# Patient Record
Sex: FEMALE | Race: WHITE | NOT HISPANIC OR LATINO | URBAN - METROPOLITAN AREA
[De-identification: names, ages, dates, MRNs, and addresses within clinical notes are randomized per-mention and may not be internally consistent; named-entity substitution may affect disease eponyms.]

---

## 2017-06-25 ENCOUNTER — INPATIENT (INPATIENT)
Facility: HOSPITAL | Age: 67
LOS: 3 days | Discharge: HOME | DRG: 305 | End: 2017-06-29
Attending: INTERNAL MEDICINE | Admitting: HOSPITALIST
Payer: MEDICARE

## 2017-06-25 VITALS
SYSTOLIC BLOOD PRESSURE: 229 MMHG | TEMPERATURE: 98 F | OXYGEN SATURATION: 97 % | DIASTOLIC BLOOD PRESSURE: 116 MMHG | RESPIRATION RATE: 18 BRPM | HEART RATE: 97 BPM

## 2017-06-25 DIAGNOSIS — I16.1 HYPERTENSIVE EMERGENCY: ICD-10-CM

## 2017-06-25 LAB
ALBUMIN SERPL ELPH-MCNC: 4.8 G/DL — SIGNIFICANT CHANGE UP (ref 3.3–5)
ALP SERPL-CCNC: 96 U/L — SIGNIFICANT CHANGE UP (ref 40–120)
ALT FLD-CCNC: 24 U/L RC — SIGNIFICANT CHANGE UP (ref 10–45)
AMYLASE P1 CFR SERPL: 103 U/L — SIGNIFICANT CHANGE UP (ref 25–125)
ANION GAP SERPL CALC-SCNC: 16 MMOL/L — SIGNIFICANT CHANGE UP (ref 5–17)
APTT BLD: 29.5 SEC — SIGNIFICANT CHANGE UP (ref 27.5–37.4)
AST SERPL-CCNC: 35 U/L — SIGNIFICANT CHANGE UP (ref 10–40)
BASOPHILS # BLD AUTO: 0 K/UL — SIGNIFICANT CHANGE UP (ref 0–0.2)
BASOPHILS NFR BLD AUTO: 0.1 % — SIGNIFICANT CHANGE UP (ref 0–2)
BILIRUB SERPL-MCNC: 0.6 MG/DL — SIGNIFICANT CHANGE UP (ref 0.2–1.2)
BUN SERPL-MCNC: 19 MG/DL — SIGNIFICANT CHANGE UP (ref 7–23)
CALCIUM SERPL-MCNC: 10 MG/DL — SIGNIFICANT CHANGE UP (ref 8.4–10.5)
CHLORIDE SERPL-SCNC: 103 MMOL/L — SIGNIFICANT CHANGE UP (ref 96–108)
CO2 SERPL-SCNC: 25 MMOL/L — SIGNIFICANT CHANGE UP (ref 22–31)
CREAT SERPL-MCNC: 1.04 MG/DL — SIGNIFICANT CHANGE UP (ref 0.5–1.3)
EOSINOPHIL # BLD AUTO: 0 K/UL — SIGNIFICANT CHANGE UP (ref 0–0.5)
EOSINOPHIL NFR BLD AUTO: 0.5 % — SIGNIFICANT CHANGE UP (ref 0–6)
GLUCOSE SERPL-MCNC: 106 MG/DL — HIGH (ref 70–99)
HCT VFR BLD CALC: 39.3 % — SIGNIFICANT CHANGE UP (ref 34.5–45)
HGB BLD-MCNC: 14.1 G/DL — SIGNIFICANT CHANGE UP (ref 11.5–15.5)
INR BLD: 0.92 RATIO — SIGNIFICANT CHANGE UP (ref 0.88–1.16)
LIDOCAIN IGE QN: 33 U/L — SIGNIFICANT CHANGE UP (ref 7–60)
LYMPHOCYTES # BLD AUTO: 2.5 K/UL — SIGNIFICANT CHANGE UP (ref 1–3.3)
LYMPHOCYTES # BLD AUTO: 32 % — SIGNIFICANT CHANGE UP (ref 13–44)
MCHC RBC-ENTMCNC: 33.6 PG — SIGNIFICANT CHANGE UP (ref 27–34)
MCHC RBC-ENTMCNC: 35.9 GM/DL — SIGNIFICANT CHANGE UP (ref 32–36)
MCV RBC AUTO: 93.7 FL — SIGNIFICANT CHANGE UP (ref 80–100)
MONOCYTES # BLD AUTO: 0.6 K/UL — SIGNIFICANT CHANGE UP (ref 0–0.9)
MONOCYTES NFR BLD AUTO: 7.9 % — SIGNIFICANT CHANGE UP (ref 2–14)
NEUTROPHILS # BLD AUTO: 4.6 K/UL — SIGNIFICANT CHANGE UP (ref 1.8–7.4)
NEUTROPHILS NFR BLD AUTO: 59.5 % — SIGNIFICANT CHANGE UP (ref 43–77)
PLATELET # BLD AUTO: 203 K/UL — SIGNIFICANT CHANGE UP (ref 150–400)
POTASSIUM SERPL-MCNC: 4.3 MMOL/L — SIGNIFICANT CHANGE UP (ref 3.5–5.3)
POTASSIUM SERPL-SCNC: 4.3 MMOL/L — SIGNIFICANT CHANGE UP (ref 3.5–5.3)
PROT SERPL-MCNC: 8.4 G/DL — HIGH (ref 6–8.3)
PROTHROM AB SERPL-ACNC: 9.9 SEC — SIGNIFICANT CHANGE UP (ref 9.8–12.7)
RBC # BLD: 4.19 M/UL — SIGNIFICANT CHANGE UP (ref 3.8–5.2)
RBC # FLD: 13.2 % — SIGNIFICANT CHANGE UP (ref 10.3–14.5)
SODIUM SERPL-SCNC: 144 MMOL/L — SIGNIFICANT CHANGE UP (ref 135–145)
WBC # BLD: 7.8 K/UL — SIGNIFICANT CHANGE UP (ref 3.8–10.5)
WBC # FLD AUTO: 7.8 K/UL — SIGNIFICANT CHANGE UP (ref 3.8–10.5)

## 2017-06-25 PROCEDURE — 72125 CT NECK SPINE W/O DYE: CPT | Mod: 26

## 2017-06-25 PROCEDURE — 99233 SBSQ HOSP IP/OBS HIGH 50: CPT

## 2017-06-25 PROCEDURE — 71010: CPT | Mod: 26

## 2017-06-25 PROCEDURE — 70450 CT HEAD/BRAIN W/O DYE: CPT | Mod: 26

## 2017-06-25 PROCEDURE — 99285 EMERGENCY DEPT VISIT HI MDM: CPT | Mod: GC

## 2017-06-25 RX ORDER — SODIUM CHLORIDE 9 MG/ML
1000 INJECTION INTRAMUSCULAR; INTRAVENOUS; SUBCUTANEOUS ONCE
Qty: 0 | Refills: 0 | Status: COMPLETED | OUTPATIENT
Start: 2017-06-25 | End: 2017-06-25

## 2017-06-25 RX ORDER — LABETALOL HCL 100 MG
10 TABLET ORAL ONCE
Qty: 0 | Refills: 0 | Status: COMPLETED | OUTPATIENT
Start: 2017-06-25 | End: 2017-06-25

## 2017-06-25 RX ORDER — TETANUS TOXOID, REDUCED DIPHTHERIA TOXOID AND ACELLULAR PERTUSSIS VACCINE, ADSORBED 5; 2.5; 8; 8; 2.5 [IU]/.5ML; [IU]/.5ML; UG/.5ML; UG/.5ML; UG/.5ML
0.5 SUSPENSION INTRAMUSCULAR ONCE
Qty: 0 | Refills: 0 | Status: COMPLETED | OUTPATIENT
Start: 2017-06-25 | End: 2017-06-25

## 2017-06-25 RX ORDER — ACETAMINOPHEN 500 MG
1000 TABLET ORAL ONCE
Qty: 0 | Refills: 0 | Status: COMPLETED | OUTPATIENT
Start: 2017-06-25 | End: 2017-06-25

## 2017-06-25 RX ORDER — AMLODIPINE BESYLATE 2.5 MG/1
5 TABLET ORAL ONCE
Qty: 0 | Refills: 0 | Status: COMPLETED | OUTPATIENT
Start: 2017-06-25 | End: 2017-06-25

## 2017-06-25 RX ADMIN — Medication 10 MILLIGRAM(S): at 23:13

## 2017-06-25 RX ADMIN — Medication 1000 MILLIGRAM(S): at 22:34

## 2017-06-25 RX ADMIN — SODIUM CHLORIDE 1000 MILLILITER(S): 9 INJECTION INTRAMUSCULAR; INTRAVENOUS; SUBCUTANEOUS at 19:53

## 2017-06-25 RX ADMIN — Medication 400 MILLIGRAM(S): at 21:05

## 2017-06-25 RX ADMIN — AMLODIPINE BESYLATE 5 MILLIGRAM(S): 2.5 TABLET ORAL at 21:05

## 2017-06-25 RX ADMIN — TETANUS TOXOID, REDUCED DIPHTHERIA TOXOID AND ACELLULAR PERTUSSIS VACCINE, ADSORBED 0.5 MILLILITER(S): 5; 2.5; 8; 8; 2.5 SUSPENSION INTRAMUSCULAR at 19:52

## 2017-06-25 NOTE — ED PROVIDER NOTE - OBJECTIVE STATEMENT
Attending Rolle: 65 y/o female presenting after fall. pt reports drinking alcohol today and falling down stairs. pt with h/o htn does not take medication for blood pressure. pt reports mild ha and bump to back of her head. had 3-4 glasses of wine. pt able to walk afterward. not on blood thinners  last tetanus unknown.

## 2017-06-25 NOTE — CONSULT NOTE ADULT - SUBJECTIVE AND OBJECTIVE BOX
Level 2 Trauma Activation    HPI: 66F s/p fall. Patient states she was at a party and was drinking some wine (about 4 glasses since 2 PM) and fell. +headstrike, denies LOC.     Primary Survey  A - airway intact  B - bilateral breath sounds and good chest rise  C - initially BP: 223/117, palpable pulses in all extremities  D - GCS 15 on arrival  Exposure obtained      Secondary survey  gen: NAD  HEENT: left forehead abrasions, left posterior scalp hematoma  CV: s1, s2, Regular rate and rhythm.   Pulm: CTA B/L  Chest: No TTP  Abd: Soft, non- distended, Non tender to palpation, no rebound, no guarding  Groin: Normal appearing  Ext: palp radial b/l UE, b/l DP palp in Lower Extremities   Back: no TTP, no palpable runoff/stepoff/deformity    PMH: HTN, ETOH abuse/relapse    PSH: ectopic pregnancy, cholecystectomy    MEDS: denies    Allergies: No Known Allergies      Labs:                        14.1   7.8   )-----------( 203      ( 25 Jun 2017 20:09 )             39.3     06-25    144  |  103  |  19  ----------------------------<  106<H>  4.3   |  25  |  1.04    Ca    10.0      25 Jun 2017 20:09    TPro  8.4<H>  /  Alb  4.8  /  TBili  0.6  /  DBili  x   /  AST  35  /  ALT  24  /  AlkPhos  96  06-25    PT/INR - ( 25 Jun 2017 20:09 )   PT: 9.9 sec;   INR: 0.92 ratio         PTT - ( 25 Jun 2017 20:09 )  PTT:29.5 sec        IMAGING:  CT head/cspine prelim radiology report:  IMPRESSION:   CT BRAIN:   Moderate left frontoparietal scalp hematoma. No calvarial fracture.  No acute intracranial bleeding, mass effect, or shift.     CT CERVICAL SPINE:   No fracture or subluxation.   Cervical spine spondylosis. Level 2 Trauma Activation    HPI: 66F s/p fall. Patient states she was at a party and was drinking some wine (about 4 glasses since 2 PM) and fell. +headstrike, denies LOC. Complains of pain to the back of her head after her fall, better with pain meds, no N/V. 10-pt ROS otherwise negative.    Primary Survey  A - airway intact  B - bilateral breath sounds and good chest rise  C - initially BP: 223/117, palpable pulses in all extremities  D - GCS 15 on arrival  Exposure obtained      Secondary survey  gen: NAD, A&Ox4, calm  HEENT: left forehead abrasions, left posterior scalp hematoma  PERRL  CV: s1, s2, Regular rate and rhythm.   Pulm: CTA B/L  Chest: No TTP  Abd: Soft, non- distended, Non tender to palpation, no rebound, no guarding  Groin: Normal appearing external genitalia  Ext: palp radial b/l UE, b/l DP palp in Lower Extremities   Back: no TTP, no palpable runoff/stepoff/deformity  Psych: normal affect    PMH: HTN, ETOH abuse/relapse    PSH: ectopic pregnancy, cholecystectomy    MEDS: denies    Allergies: No Known Allergies    SHx: +alcohol, no tobacco    FHx: non-contributory      Labs:                        14.1   7.8   )-----------( 203      ( 25 Jun 2017 20:09 )             39.3     06-25    144  |  103  |  19  ----------------------------<  106<H>  4.3   |  25  |  1.04    Ca    10.0      25 Jun 2017 20:09    TPro  8.4<H>  /  Alb  4.8  /  TBili  0.6  /  DBili  x   /  AST  35  /  ALT  24  /  AlkPhos  96  06-25    PT/INR - ( 25 Jun 2017 20:09 )   PT: 9.9 sec;   INR: 0.92 ratio         PTT - ( 25 Jun 2017 20:09 )  PTT:29.5 sec        IMAGING:  CT head/cspine prelim radiology report:  IMPRESSION:   CT BRAIN:   Moderate left frontoparietal scalp hematoma. No calvarial fracture.  No acute intracranial bleeding, mass effect, or shift.     CT CERVICAL SPINE:   No fracture or subluxation.   Cervical spine spondylosis.

## 2017-06-25 NOTE — ED PROVIDER NOTE - PHYSICAL EXAMINATION
Attending Rolle: Gen: nad, heent: posterior hematoma, mmm, eomi, neck: trachea midline, collared, chest: nttp, cv: rrr, lungs; Ctab, abd: soft, nontender, nondistended.no peritoneal signs, skin: surgical scar to abdomen. pelvis: stable, ext: wwp, no deformity, neuro awake and alert following commands Attending Rolle: Gen: nad, heent: posterior hematoma, frontoparietal hematoma, no active bleeding mmm, eomi, neck: trachea midline, collared, chest: nttp, cv: rrr, lungs; Ctab, abd: soft, nontender, nondistended.no peritoneal signs, skin: surgical scar to abdomen. pelvis: stable, ext: wwp, no deformity, neuro awake and alert following commands

## 2017-06-25 NOTE — CONSULT NOTE ADULT - ASSESSMENT
66F s/p fall with posterior scalp hematoma  -await final reports of CT scans  -ED to clear c-collar by confrontational exam  -If patient is able to ambulate/tolerate diet, and CT scan final reads show no acute injury, poss d/c home  -d/w Dr. Centeno

## 2017-06-25 NOTE — CONSULT NOTE ADULT - ATTENDING COMMENTS
I saw and examined pt on 6/25 during level 2 trauma activation, agree with above. Pt was alert and awake, said that she had several drinks at a party and fell on the stairs, hitting her head. No LOC. Left occipital scalp hematoma and left hand abrasions. CT head and C-spine negative for acute injury. Admitted to Medicine for BP control.

## 2017-06-25 NOTE — ED PROVIDER NOTE - MEDICAL DECISION MAKING DETAILS
Attending Aquilino: 67 y/o female with h/o htn not on medications presenting after fall. pt with +etoh. upon arrival pt found to be sig hypertensive. asymptomatic at that time without headache or chest pain. pt awake and following commands. abd soft and nonteder and hemodynamically stable making solid organ injury less liekly. ct head and neck negative for acute pathology. pt with sig hypertension and while in the ed developed headache. unclear whether related to bp, recent alcohol use, vs concussion. with sig htn with treat for urgency and admit

## 2017-06-26 DIAGNOSIS — I16.0 HYPERTENSIVE URGENCY: ICD-10-CM

## 2017-06-26 DIAGNOSIS — M62.82 RHABDOMYOLYSIS: ICD-10-CM

## 2017-06-26 DIAGNOSIS — F10.20 ALCOHOL DEPENDENCE, UNCOMPLICATED: ICD-10-CM

## 2017-06-26 LAB
ANION GAP SERPL CALC-SCNC: 13 MMOL/L — SIGNIFICANT CHANGE UP (ref 5–17)
APPEARANCE UR: CLEAR — SIGNIFICANT CHANGE UP
BACTERIA # UR AUTO: ABNORMAL /HPF
BILIRUB UR-MCNC: NEGATIVE — SIGNIFICANT CHANGE UP
BUN SERPL-MCNC: 18 MG/DL — SIGNIFICANT CHANGE UP (ref 7–23)
CALCIUM SERPL-MCNC: 9.3 MG/DL — SIGNIFICANT CHANGE UP (ref 8.4–10.5)
CHLORIDE SERPL-SCNC: 101 MMOL/L — SIGNIFICANT CHANGE UP (ref 96–108)
CK MB BLD-MCNC: 0.9 % — SIGNIFICANT CHANGE UP (ref 0–3.5)
CK MB BLD-MCNC: 1.5 % — SIGNIFICANT CHANGE UP (ref 0–3.5)
CK MB CFR SERPL CALC: 11.9 NG/ML — HIGH (ref 0–3.8)
CK MB CFR SERPL CALC: 22.5 NG/ML — HIGH (ref 0–3.8)
CK MB CFR SERPL CALC: 27.9 NG/ML — HIGH (ref 0–3.8)
CK SERPL-CCNC: 1889 U/L — HIGH (ref 25–170)
CK SERPL-CCNC: 2424 U/L — HIGH (ref 25–170)
CK SERPL-CCNC: 2425 U/L — HIGH (ref 25–170)
CO2 SERPL-SCNC: 24 MMOL/L — SIGNIFICANT CHANGE UP (ref 22–31)
COLOR SPEC: YELLOW — SIGNIFICANT CHANGE UP
CREAT ?TM UR-MCNC: 75 MG/DL — SIGNIFICANT CHANGE UP
CREAT SERPL-MCNC: 0.75 MG/DL — SIGNIFICANT CHANGE UP (ref 0.5–1.3)
DIFF PNL FLD: NEGATIVE — SIGNIFICANT CHANGE UP
EPI CELLS # UR: SIGNIFICANT CHANGE UP /HPF
GLUCOSE SERPL-MCNC: 130 MG/DL — HIGH (ref 70–99)
GLUCOSE UR QL: NEGATIVE — SIGNIFICANT CHANGE UP
HAV IGM SER-ACNC: SIGNIFICANT CHANGE UP
HBV CORE IGM SER-ACNC: SIGNIFICANT CHANGE UP
HBV SURFACE AG SER-ACNC: SIGNIFICANT CHANGE UP
HCV AB S/CO SERPL IA: 0.16 S/CO — SIGNIFICANT CHANGE UP
HCV AB SERPL-IMP: SIGNIFICANT CHANGE UP
KETONES UR-MCNC: NEGATIVE — SIGNIFICANT CHANGE UP
LEUKOCYTE ESTERASE UR-ACNC: ABNORMAL
MAGNESIUM SERPL-MCNC: 1.8 MG/DL — SIGNIFICANT CHANGE UP (ref 1.6–2.6)
MYOGLOBIN SERPL-MCNC: 110 NG/ML — HIGH (ref 9–82)
NITRITE UR-MCNC: NEGATIVE — SIGNIFICANT CHANGE UP
PH UR: 6.5 — SIGNIFICANT CHANGE UP (ref 5–8)
PHOSPHATE SERPL-MCNC: 3.6 MG/DL — SIGNIFICANT CHANGE UP (ref 2.5–4.5)
POTASSIUM SERPL-MCNC: 3.8 MMOL/L — SIGNIFICANT CHANGE UP (ref 3.5–5.3)
POTASSIUM SERPL-SCNC: 3.8 MMOL/L — SIGNIFICANT CHANGE UP (ref 3.5–5.3)
PROT ?TM UR-MCNC: 11 MG/DL — SIGNIFICANT CHANGE UP (ref 0–12)
PROT UR-MCNC: SIGNIFICANT CHANGE UP
PROT/CREAT UR-RTO: 0.1 RATIO — SIGNIFICANT CHANGE UP (ref 0–0.2)
RBC CASTS # UR COMP ASSIST: SIGNIFICANT CHANGE UP /HPF (ref 0–2)
SODIUM SERPL-SCNC: 138 MMOL/L — SIGNIFICANT CHANGE UP (ref 135–145)
SP GR SPEC: 1.02 — SIGNIFICANT CHANGE UP (ref 1.01–1.02)
TROPONIN T SERPL-MCNC: <0.01 NG/ML — SIGNIFICANT CHANGE UP (ref 0–0.06)
UROBILINOGEN FLD QL: NEGATIVE — SIGNIFICANT CHANGE UP
WBC UR QL: >50 /HPF (ref 0–5)

## 2017-06-26 PROCEDURE — 99223 1ST HOSP IP/OBS HIGH 75: CPT

## 2017-06-26 PROCEDURE — 99233 SBSQ HOSP IP/OBS HIGH 50: CPT

## 2017-06-26 RX ORDER — ONDANSETRON 8 MG/1
4 TABLET, FILM COATED ORAL
Qty: 0 | Refills: 0 | Status: DISCONTINUED | OUTPATIENT
Start: 2017-06-26 | End: 2017-06-29

## 2017-06-26 RX ORDER — METOCLOPRAMIDE HCL 10 MG
10 TABLET ORAL ONCE
Qty: 0 | Refills: 0 | Status: COMPLETED | OUTPATIENT
Start: 2017-06-26 | End: 2017-06-26

## 2017-06-26 RX ORDER — ACETAMINOPHEN 500 MG
650 TABLET ORAL EVERY 6 HOURS
Qty: 0 | Refills: 0 | Status: DISCONTINUED | OUTPATIENT
Start: 2017-06-26 | End: 2017-06-29

## 2017-06-26 RX ORDER — FOLIC ACID 0.8 MG
1 TABLET ORAL DAILY
Qty: 0 | Refills: 0 | Status: DISCONTINUED | OUTPATIENT
Start: 2017-06-26 | End: 2017-06-29

## 2017-06-26 RX ORDER — LABETALOL HCL 100 MG
100 TABLET ORAL ONCE
Qty: 0 | Refills: 0 | Status: COMPLETED | OUTPATIENT
Start: 2017-06-26 | End: 2017-06-26

## 2017-06-26 RX ORDER — THIAMINE MONONITRATE (VIT B1) 100 MG
100 TABLET ORAL DAILY
Qty: 0 | Refills: 0 | Status: COMPLETED | OUTPATIENT
Start: 2017-06-26 | End: 2017-06-28

## 2017-06-26 RX ORDER — METOCLOPRAMIDE HCL 10 MG
10 TABLET ORAL ONCE
Qty: 0 | Refills: 0 | Status: DISCONTINUED | OUTPATIENT
Start: 2017-06-26 | End: 2017-06-26

## 2017-06-26 RX ORDER — SODIUM CHLORIDE 9 MG/ML
1000 INJECTION, SOLUTION INTRAVENOUS
Qty: 0 | Refills: 0 | Status: DISCONTINUED | OUTPATIENT
Start: 2017-06-26 | End: 2017-06-27

## 2017-06-26 RX ORDER — METOPROLOL TARTRATE 50 MG
25 TABLET ORAL
Qty: 0 | Refills: 0 | Status: DISCONTINUED | OUTPATIENT
Start: 2017-06-26 | End: 2017-06-29

## 2017-06-26 RX ADMIN — Medication 1 MILLIGRAM(S): at 11:35

## 2017-06-26 RX ADMIN — Medication 1 TABLET(S): at 11:35

## 2017-06-26 RX ADMIN — Medication 100 MILLIGRAM(S): at 11:35

## 2017-06-26 RX ADMIN — Medication 650 MILLIGRAM(S): at 21:03

## 2017-06-26 RX ADMIN — Medication 650 MILLIGRAM(S): at 04:41

## 2017-06-26 RX ADMIN — Medication 650 MILLIGRAM(S): at 12:13

## 2017-06-26 RX ADMIN — Medication 100 MILLIGRAM(S): at 03:01

## 2017-06-26 RX ADMIN — Medication 650 MILLIGRAM(S): at 12:45

## 2017-06-26 RX ADMIN — Medication 10 MILLIGRAM(S): at 00:19

## 2017-06-26 RX ADMIN — Medication 650 MILLIGRAM(S): at 21:33

## 2017-06-26 RX ADMIN — SODIUM CHLORIDE 100 MILLILITER(S): 9 INJECTION, SOLUTION INTRAVENOUS at 17:47

## 2017-06-26 RX ADMIN — Medication 650 MILLIGRAM(S): at 05:23

## 2017-06-26 RX ADMIN — Medication 25 MILLIGRAM(S): at 06:06

## 2017-06-26 RX ADMIN — Medication 25 MILLIGRAM(S): at 17:40

## 2017-06-26 NOTE — H&P ADULT - NSHPLABSRESULTS_GEN_ALL_CORE
WBC 7.8.  Hgb 14.1.  Platelets of 203K.  INR 0.9.  K+ 4.3.  Random glucose of 106.  Cr 1.0.  Alb 4.8.  CPK 1889 (suspected mild rhabdomyolysis)  Troponin nil x 2.  CTT head with no intracranial hemorrhage, LEFT parietal scalp hematoma.  Chest radiograph reviewed with no infiltate or effusion.  EKG tracing reviewed with NSR at 97.

## 2017-06-26 NOTE — H&P ADULT - PROBLEM SELECTOR PLAN 3
Mild rhabdomyolysis with intact renal function.  Will follow with enzymes.   Patient's HTN precludes aggressive IVF NS.

## 2017-06-26 NOTE — PROGRESS NOTE ADULT - PROBLEM SELECTOR PLAN 3
Mild rhabdomyolysis with intact renal function.  Will follow with enzymes.   Start patient on Ringer Lactate @100 cc/hr. Monitor lytes.

## 2017-06-26 NOTE — H&P ADULT - HISTORY OF PRESENT ILLNESS
NIGHT HOSPITALIST:  Patient UNKNOWN to me previously, assigned to me at this point via the ER to admit this 65 y/o F--patient irregularly followed by a Dr. Dial (?) in [facility]--patient is a retired speech pathologist--self refers to [place] following a fall down residential down her stairs at home while imbibing alcohol.  Patient admits that she was abstinent until 3 months ago (patient did not clarify prior intake) but reports daily wine intake with beer chasers.  Last intake was prior to arrival in the ER yesterday afternoon.  Patient denies prior hospitalizations.  Denies street drug use.  Patient reports no suicidal or homicidal ideation and reports no domestic violence issues.  Mild HA earlier in the ER, but denies HA, focal weakness.  No chest pain/pressure.  NO dyspnea.  NO N/V.  No abdominal pain, no red blood per rectum or melena.  No back pain, no tearing back pain.  No hematuria, no dysuria.  Patient denies anorexia but notes recent weight gain.  Remaining review of systems not contributory. NIGHT HOSPITALIST:  Patient UNKNOWN to me previously, assigned to me at this point via the ER to admit this 65 y/o F--patient irregularly followed by a Dr. 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  Patient admits that she was abstinent until 3 months ago (patient did not clarify prior intake) but reports daily wine intake with beer chasers.  Last intake was prior to arrival in the ER yesterday afternoon.  Patient denies prior hospitalizations.  Denies street drug use.  Patient reports no suicidal or homicidal ideation and reports no domestic violence issues.  Mild HA earlier in the ER, but denies HA, focal weakness.  No chest pain/pressure.  NO dyspnea.  NO N/V.  No abdominal pain, no red blood per rectum or melena.  No back pain, no tearing back pain.  No hematuria, no dysuria.  Patient denies anorexia but notes recent weight gain.  Remaining review of systems not contributory.    No family in attendance and patient does not wish family to be contacted.

## 2017-06-26 NOTE — ED ADULT NURSE REASSESSMENT NOTE - NS ED NURSE REASSESS COMMENT FT1
MD aware of increased blood pressure. IV/PO anti hypertensives administered. RN spoke to hospitalist. Awaiting inpatient orders. Will continue to monitor

## 2017-06-26 NOTE — H&P ADULT - FAMILY HISTORY
Father  Still living? Unknown  Family history of essential hypertension, Age at diagnosis: Age Unknown  Family history of alcohol abuse, Age at diagnosis: 61-70     Mother  Still living? No  Family history of essential hypertension, Age at diagnosis: Age Unknown  Family history of alcohol abuse, Age at diagnosis: 41-50

## 2017-06-26 NOTE — PROGRESS NOTE ADULT - SUBJECTIVE AND OBJECTIVE BOX
Patient is a 66y old  Female who presents with a chief complaint of s/p fall   SUBJECTIVE / OVERNIGHT EVENTS: No events Over night.     MEDICATIONS  (STANDING):  folic acid 1milliGRAM(s) Oral daily  multivitamin 1Tablet(s) Oral daily  thiamine 100milliGRAM(s) Oral daily  metoprolol 25milliGRAM(s) Oral two times a day    MEDICATIONS  (PRN):  LORazepam     Tablet 2milliGRAM(s) Oral every 2 hours PRN Symptom-triggered: 2 point increase in CIWA -Ar score and a total score of 7 or LESS  LORazepam   Injectable 2milliGRAM(s) IV Push every 1 hour PRN Symptom-triggered: each CIWA -Ar score 8 or GREATER  ondansetron Injectable 4milliGRAM(s) IV Push two times a day PRN Nausea  acetaminophen   Tablet. 650milliGRAM(s) Oral every 6 hours PRN Mild Pain (1 - 3)    MEDICATIONS  (STANDING):  folic acid 1milliGRAM(s) Oral daily  multivitamin 1Tablet(s) Oral daily  thiamine 100milliGRAM(s) Oral daily  metoprolol 25milliGRAM(s) Oral two times a day    MEDICATIONS  (PRN):  LORazepam     Tablet 2milliGRAM(s) Oral every 2 hours PRN Symptom-triggered: 2 point increase in CIWA -Ar score and a total score of 7 or LESS  LORazepam   Injectable 2milliGRAM(s) IV Push every 1 hour PRN Symptom-triggered: each CIWA -Ar score 8 or GREATER  ondansetron Injectable 4milliGRAM(s) IV Push two times a day PRN Nausea  acetaminophen   Tablet. 650milliGRAM(s) Oral every 6 hours PRN Mild Pain (1 - 3)            PHYSICAL EXAM:  GENERAL: NAD, well-developed  HEAD:  Atraumatic, Normocephalic  EYES: EOMI, PERRLA, conjunctiva and sclera clear, Ecchymosis around Lt eye  Posterior scalp hematoma    NECK: Supple, No JVD  CHEST/LUNG: Clear to auscultation bilaterally; No wheeze  HEART: Regular rate and rhythm  ABDOMEN: Soft, Nontender, Nondistended; Bowel sounds present  EXTREMITIES:  2+ Peripheral Pulses, No clubbing, cyanosis, or edema  PSYCH: AAOx3  NEUROLOGY: non-focal  SKIN: No rashes or lesions        RADIOLOGY & ADDITIONAL TESTS:    Imaging Personally Reviewed:    Consultant(s) Notes Reviewed:  Trauma Surgery     Care Discussed with Consultants/Other Providers:

## 2017-06-26 NOTE — H&P ADULT - PROBLEM SELECTOR PLAN 2
CIWA protocol as above with symptom based treatment for now, but may need to reevaluate if need for low dose Librium.  Social work.  Thiamine, folate, MVI.

## 2017-06-26 NOTE — H&P ADULT - ATTENDING COMMENTS
NIGHT HOSPITALIST;  Patient aware of course and agrees with plan/care as above.  Care reviewed with covering NP.  Care assumed by the DAY HOSPITALIST.

## 2017-06-26 NOTE — H&P ADULT - ASSESSMENT
NIGHT HOSPITALIST:  Presentation of patient in the setting S/P fall--seen and cleared by the ER attending for trauma in the setting of ethanol dependency>>will place on symptom based HIGH risk protocol for now.  Social work.  Patient with essential HTN with poor control and admitted for hypertensive urgency.  Will provide one dose of oral labetalol but will initiate low dose metoprolol 25 BID.  Norvasc is an ideal outpatient medication but will take 1-2 weeks for target goals, albeit in Ms. Horacio would be systolic target to 170-180 Torr.  Serial enzymes, echo.

## 2017-06-27 DIAGNOSIS — M25.532 PAIN IN LEFT WRIST: ICD-10-CM

## 2017-06-27 LAB
ANION GAP SERPL CALC-SCNC: 13 MMOL/L — SIGNIFICANT CHANGE UP (ref 5–17)
BUN SERPL-MCNC: 12 MG/DL — SIGNIFICANT CHANGE UP (ref 7–23)
CALCIUM SERPL-MCNC: 9.1 MG/DL — SIGNIFICANT CHANGE UP (ref 8.4–10.5)
CHLORIDE SERPL-SCNC: 101 MMOL/L — SIGNIFICANT CHANGE UP (ref 96–108)
CK SERPL-CCNC: 1744 U/L — HIGH (ref 25–170)
CO2 SERPL-SCNC: 26 MMOL/L — SIGNIFICANT CHANGE UP (ref 22–31)
CREAT SERPL-MCNC: 0.66 MG/DL — SIGNIFICANT CHANGE UP (ref 0.5–1.3)
GLUCOSE SERPL-MCNC: 112 MG/DL — HIGH (ref 70–99)
HCT VFR BLD CALC: 35.7 % — SIGNIFICANT CHANGE UP (ref 34.5–45)
HGB BLD-MCNC: 11.9 G/DL — SIGNIFICANT CHANGE UP (ref 11.5–15.5)
MAGNESIUM SERPL-MCNC: 2 MG/DL — SIGNIFICANT CHANGE UP (ref 1.6–2.6)
MCHC RBC-ENTMCNC: 29.7 PG — SIGNIFICANT CHANGE UP (ref 27–34)
MCHC RBC-ENTMCNC: 33.3 GM/DL — SIGNIFICANT CHANGE UP (ref 32–36)
MCV RBC AUTO: 89 FL — SIGNIFICANT CHANGE UP (ref 80–100)
MYOGLOBIN SERPL-MCNC: 54 NG/ML — SIGNIFICANT CHANGE UP (ref 9–82)
PHOSPHATE SERPL-MCNC: 3.5 MG/DL — SIGNIFICANT CHANGE UP (ref 2.5–4.5)
PLATELET # BLD AUTO: 204 K/UL — SIGNIFICANT CHANGE UP (ref 150–400)
POTASSIUM SERPL-MCNC: 4.2 MMOL/L — SIGNIFICANT CHANGE UP (ref 3.5–5.3)
POTASSIUM SERPL-SCNC: 4.2 MMOL/L — SIGNIFICANT CHANGE UP (ref 3.5–5.3)
RBC # BLD: 4.01 M/UL — SIGNIFICANT CHANGE UP (ref 3.8–5.2)
RBC # FLD: 15 % — HIGH (ref 10.3–14.5)
SODIUM SERPL-SCNC: 140 MMOL/L — SIGNIFICANT CHANGE UP (ref 135–145)
WBC # BLD: 6.47 K/UL — SIGNIFICANT CHANGE UP (ref 3.8–10.5)
WBC # FLD AUTO: 6.47 K/UL — SIGNIFICANT CHANGE UP (ref 3.8–10.5)

## 2017-06-27 PROCEDURE — 73090 X-RAY EXAM OF FOREARM: CPT | Mod: 26,LT

## 2017-06-27 PROCEDURE — 99233 SBSQ HOSP IP/OBS HIGH 50: CPT | Mod: GC

## 2017-06-27 RX ORDER — LISINOPRIL 2.5 MG/1
2.5 TABLET ORAL DAILY
Qty: 0 | Refills: 0 | Status: DISCONTINUED | OUTPATIENT
Start: 2017-06-27 | End: 2017-06-28

## 2017-06-27 RX ORDER — SODIUM CHLORIDE 9 MG/ML
1000 INJECTION, SOLUTION INTRAVENOUS
Qty: 0 | Refills: 0 | Status: DISCONTINUED | OUTPATIENT
Start: 2017-06-27 | End: 2017-06-29

## 2017-06-27 RX ADMIN — Medication 1 TABLET(S): at 12:23

## 2017-06-27 RX ADMIN — Medication 650 MILLIGRAM(S): at 08:25

## 2017-06-27 RX ADMIN — Medication 25 MILLIGRAM(S): at 05:56

## 2017-06-27 RX ADMIN — Medication 650 MILLIGRAM(S): at 09:10

## 2017-06-27 RX ADMIN — Medication 650 MILLIGRAM(S): at 21:30

## 2017-06-27 RX ADMIN — Medication 25 MILLIGRAM(S): at 17:19

## 2017-06-27 RX ADMIN — Medication 650 MILLIGRAM(S): at 22:00

## 2017-06-27 RX ADMIN — Medication 100 MILLIGRAM(S): at 12:23

## 2017-06-27 RX ADMIN — Medication 1 MILLIGRAM(S): at 12:23

## 2017-06-27 RX ADMIN — SODIUM CHLORIDE 100 MILLILITER(S): 9 INJECTION, SOLUTION INTRAVENOUS at 12:23

## 2017-06-27 NOTE — PROGRESS NOTE ADULT - PROBLEM SELECTOR PLAN 1
Mild rhabdomyolysis with intact renal function.  Will follow with enzymes.   Start patient on Ringer Lactate @100 cc/hr. Monitor lytes. Resolving with CPK levels around 1744 with intact renal function.  Continue with Ringer Lactate @100cc/hr.

## 2017-06-27 NOTE — PROGRESS NOTE ADULT - ASSESSMENT
NIGHT HOSPITALIST:  Presentation of patient in the setting S/P fall--seen and cleared by the ER attending for trauma in the setting of ethanol dependency>>will place on symptom based HIGH risk protocol for now.  Social work.  Patient with essential HTN with poor control and admitted for hypertensive urgency.  Will provide one dose of oral labetalol but will initiate low dose metoprolol 25 BID.  Norvasc is an ideal outpatient medication but will take 1-2 weeks for target goals, albeit in Ms. Horacio would be systolic target to 170-180 Torr.  Serial enzymes, echo. 66 F with hx Alcohol dependence, HTN admitted with Hypertensive Urgency, mild Rhabdomyolysis s/p fall

## 2017-06-27 NOTE — DIETITIAN INITIAL EVALUATION ADULT. - ENERGY NEEDS
Height: 5' 5"      Weight: 180.9 lbs   BMI: 30.1 kg/m2      IBW: 125 lbs  (+/- 10%)    % IBW: 145 %          Edema:  1+ right/left ankle, right/left foot    Skin:  no impairment   BM:  last BM 6/25/17    Other pertinent information:  Patient presented s/p fall - half a flight of stairs, ethanol intake.  Admitted with hypertensive urgency, alcohol dependency, Rhabdomyolysis.

## 2017-06-27 NOTE — PROGRESS NOTE ADULT - PROBLEM SELECTOR PLAN 4
CIWA protocol as above with symptom based treatment for now, but may need to reevaluate if need for low dose Librium.  Social work.  Thiamine, folate, MVI. Continue with MVI, Thiamine and Folate.

## 2017-06-27 NOTE — DIETITIAN INITIAL EVALUATION ADULT. - OTHER INFO
Nutrition consult for ethanol dependency and HTN received.  Patient denies having any difficulty chewing or swallowing and no nausea/emesis or GI discomfort. Nutrition consult for ethanol dependency and HTN received.  Patient reports having a good appetite at the present time.  She denies having any difficulty chewing or swallowing and no nausea/emesis or GI discomfort.  Patient stated that she had a "drinking relapse" over the past few months, almost daily intakes of wine and beer.  States that she had not been exercising and had been gaining weight.  Reports she had achieved a weight of 150 lbs a few years ago.  Patient had taken vitamins in the past, not recently.  Confirmed NKFA.

## 2017-06-27 NOTE — DIETITIAN INITIAL EVALUATION ADULT. - ORAL INTAKE PTA
good Patient reports having a good appetite PTA eating 3 meals daily.   Typically consumes egg and turkey shelton sandwich at breakfast, Japanese food at lunch and chicken, rice and vegetables for dinner./good

## 2017-06-27 NOTE — DIETITIAN INITIAL EVALUATION ADULT. - NS AS NUTRI INTERV ED CONTENT
Educated patient on heart healthy nutrition therapy, label reading  to reduce saturated fat/sodium intake.  Discussed importance of not adding salt to foods and avoiding foods with high sodium content, suggesting alternate choices../Purpose of the nutrition education

## 2017-06-27 NOTE — PROGRESS NOTE ADULT - SUBJECTIVE AND OBJECTIVE BOX
Patient is a 66y old  Female who presents with a chief complaint of s/p fall   SUBJECTIVE / OVERNIGHT EVENTS: No events Over night.      MEDICATIONS  (STANDING):  folic acid 1 milliGRAM(s) Oral daily  multivitamin 1 Tablet(s) Oral daily  thiamine 100 milliGRAM(s) Oral daily  metoprolol 25 milliGRAM(s) Oral two times a day  lactated ringers. 1000 milliLiter(s) (100 mL/Hr) IV Continuous <Continuous>  lisinopril 2.5 milliGRAM(s) Oral daily    MEDICATIONS  (PRN):  LORazepam     Tablet 2 milliGRAM(s) Oral every 2 hours PRN Symptom-triggered: 2 point increase in CIWA -Ar score and a total score of 7 or LESS  LORazepam   Injectable 2 milliGRAM(s) IV Push every 1 hour PRN Symptom-triggered: each CIWA -Ar score 8 or GREATER  ondansetron Injectable 4 milliGRAM(s) IV Push two times a day PRN Nausea  acetaminophen   Tablet. 650 milliGRAM(s) Oral every 6 hours PRN Mild Pain (1 - 3)    T(C): 37.1 (06-27-17 @ 12:00), Max: 37.7 (06-27-17 @ 04:51)  HR: 63 (06-27-17 @ 12:00) (63 - 76)  BP: 159/84 (06-27-17 @ 12:00) (159/84 - 180/91)  RR: 20 (06-27-17 @ 12:00) (18 - 20)  SpO2: 95% (06-27-17 @ 12:00) (95% - 98%)          PHYSICAL EXAM:  GENERAL: NAD, well-developed  HEAD:  Atraumatic, Normocephalic  EYES: EOMI, conjunctiva and sclera clear, Ecchymosis around Lt eye  Posterior scalp hematoma    NECK: Supple, No JVD  CHEST/LUNG: Clear to auscultation bilaterally; No wheeze  HEART: Regular rate and rhythm  ABDOMEN: Soft, Nontender, Nondistended; Bowel sounds present  EXTREMITIES:  2+ Peripheral Pulses, No clubbing, cyanosis, or edema, Lt wrist swollen, erythematous  PSYCH: AAOx3  NEUROLOGY: non-focal  SKIN: No rashes or lesions        RADIOLOGY & ADDITIONAL TESTS:    Imaging Personally Reviewed:    Consultant(s) Notes Reviewed:  Trauma Surgery     Care Discussed with Consultants/Other Providers:

## 2017-06-27 NOTE — PROGRESS NOTE ADULT - PROBLEM SELECTOR PLAN 3
Monitor BP, continue with Metoprolol, add Lisinopril. Stable currently, continue with Metoprolol, add Lisinopril.

## 2017-06-27 NOTE — DIETITIAN INITIAL EVALUATION ADULT. - ADHERENCE
poor/Patient tries not to add salt to her food at the table, although,  admits that she has not been adhering to low salt or low cholesterol diet restrictions.  She had been eating out more often.

## 2017-06-28 DIAGNOSIS — I10 ESSENTIAL (PRIMARY) HYPERTENSION: ICD-10-CM

## 2017-06-28 LAB
ANION GAP SERPL CALC-SCNC: 12 MMOL/L — SIGNIFICANT CHANGE UP (ref 5–17)
BASOPHILS # BLD AUTO: 0.01 K/UL — SIGNIFICANT CHANGE UP (ref 0–0.2)
BASOPHILS NFR BLD AUTO: 0.2 % — SIGNIFICANT CHANGE UP (ref 0–2)
BUN SERPL-MCNC: 14 MG/DL — SIGNIFICANT CHANGE UP (ref 7–23)
CALCIUM SERPL-MCNC: 9.1 MG/DL — SIGNIFICANT CHANGE UP (ref 8.4–10.5)
CHLORIDE SERPL-SCNC: 102 MMOL/L — SIGNIFICANT CHANGE UP (ref 96–108)
CK SERPL-CCNC: 909 U/L — HIGH (ref 25–170)
CO2 SERPL-SCNC: 26 MMOL/L — SIGNIFICANT CHANGE UP (ref 22–31)
CREAT SERPL-MCNC: 0.53 MG/DL — SIGNIFICANT CHANGE UP (ref 0.5–1.3)
EOSINOPHIL # BLD AUTO: 0.15 K/UL — SIGNIFICANT CHANGE UP (ref 0–0.5)
EOSINOPHIL NFR BLD AUTO: 2.4 % — SIGNIFICANT CHANGE UP (ref 0–6)
GLUCOSE SERPL-MCNC: 99 MG/DL — SIGNIFICANT CHANGE UP (ref 70–99)
HCT VFR BLD CALC: 35.7 % — SIGNIFICANT CHANGE UP (ref 34.5–45)
HGB BLD-MCNC: 11.9 G/DL — SIGNIFICANT CHANGE UP (ref 11.5–15.5)
IMM GRANULOCYTES NFR BLD AUTO: 0.2 % — SIGNIFICANT CHANGE UP (ref 0–1.5)
LYMPHOCYTES # BLD AUTO: 1.35 K/UL — SIGNIFICANT CHANGE UP (ref 1–3.3)
LYMPHOCYTES # BLD AUTO: 21.5 % — SIGNIFICANT CHANGE UP (ref 13–44)
MCHC RBC-ENTMCNC: 30.4 PG — SIGNIFICANT CHANGE UP (ref 27–34)
MCHC RBC-ENTMCNC: 33.3 GM/DL — SIGNIFICANT CHANGE UP (ref 32–36)
MCV RBC AUTO: 91.1 FL — SIGNIFICANT CHANGE UP (ref 80–100)
MONOCYTES # BLD AUTO: 0.65 K/UL — SIGNIFICANT CHANGE UP (ref 0–0.9)
MONOCYTES NFR BLD AUTO: 10.4 % — SIGNIFICANT CHANGE UP (ref 2–14)
NEUTROPHILS # BLD AUTO: 4.1 K/UL — SIGNIFICANT CHANGE UP (ref 1.8–7.4)
NEUTROPHILS NFR BLD AUTO: 65.3 % — SIGNIFICANT CHANGE UP (ref 43–77)
PLATELET # BLD AUTO: 191 K/UL — SIGNIFICANT CHANGE UP (ref 150–400)
POTASSIUM SERPL-MCNC: 4 MMOL/L — SIGNIFICANT CHANGE UP (ref 3.5–5.3)
POTASSIUM SERPL-SCNC: 4 MMOL/L — SIGNIFICANT CHANGE UP (ref 3.5–5.3)
RBC # BLD: 3.92 M/UL — SIGNIFICANT CHANGE UP (ref 3.8–5.2)
RBC # FLD: 15 % — HIGH (ref 10.3–14.5)
SODIUM SERPL-SCNC: 140 MMOL/L — SIGNIFICANT CHANGE UP (ref 135–145)
TSH SERPL-MCNC: 5.44 UIU/ML — HIGH (ref 0.27–4.2)
WBC # BLD: 6.27 K/UL — SIGNIFICANT CHANGE UP (ref 3.8–10.5)
WBC # FLD AUTO: 6.27 K/UL — SIGNIFICANT CHANGE UP (ref 3.8–10.5)

## 2017-06-28 PROCEDURE — 99233 SBSQ HOSP IP/OBS HIGH 50: CPT

## 2017-06-28 PROCEDURE — 93306 TTE W/DOPPLER COMPLETE: CPT | Mod: 26

## 2017-06-28 RX ORDER — LISINOPRIL 2.5 MG/1
5 TABLET ORAL DAILY
Qty: 0 | Refills: 0 | Status: DISCONTINUED | OUTPATIENT
Start: 2017-06-29 | End: 2017-06-29

## 2017-06-28 RX ORDER — LISINOPRIL 2.5 MG/1
2.5 TABLET ORAL ONCE
Qty: 0 | Refills: 0 | Status: COMPLETED | OUTPATIENT
Start: 2017-06-28 | End: 2017-06-28

## 2017-06-28 RX ADMIN — Medication 25 MILLIGRAM(S): at 05:38

## 2017-06-28 RX ADMIN — Medication 1 TABLET(S): at 12:35

## 2017-06-28 RX ADMIN — Medication 100 MILLIGRAM(S): at 12:35

## 2017-06-28 RX ADMIN — LISINOPRIL 2.5 MILLIGRAM(S): 2.5 TABLET ORAL at 05:37

## 2017-06-28 RX ADMIN — Medication 1 MILLIGRAM(S): at 12:35

## 2017-06-28 RX ADMIN — LISINOPRIL 2.5 MILLIGRAM(S): 2.5 TABLET ORAL at 14:58

## 2017-06-28 RX ADMIN — Medication 25 MILLIGRAM(S): at 17:53

## 2017-06-28 NOTE — PROGRESS NOTE ADULT - SUBJECTIVE AND OBJECTIVE BOX
FAITH KILGOREO  66y  Female      Patient is a 66y old  Female who presents with a chief complaint of s/p fall (26 Jun 2017 05:53)      INTERVAL HPI/OVERNIGHT EVENTS:  No overnight event.  No headache.  Patient's BP is still optimal on Lisinopril and Metoprolol.    No hallucinations as per nurse / CIWA= 0 .    REVIEW OF SYSTEMS:  CONSTITUTIONAL: No fever  EYES: No eye pain, visual disturbances, or discharge  ENMT:  Nor throat pain  NECK: No pain or stiffness  RESPIRATORY: No cough, wheezing, chills or hemoptysis; No shortness of breath  CARDIOVASCULAR: No chest pain, palpitations, dizziness, or leg swelling  GASTROINTESTINAL: No abdominal or epigastric pain. No nausea, vomiting, or hematemesis; No diarrhea or constipation  NEUROLOGICAL: No headaches,  no tremors  MUSCULOSKELETAL: No joint pain currently   PSYCHIATRIC: No delusions or hallucinations    T(C): 36.8 (06-28-17 @ 11:32), Max: 37.7 (06-27-17 @ 20:58)  HR: 67 (06-28-17 @ 11:32) (62 - 75)  BP: 170/91 (06-28-17 @ 11:32) (138/90 - 174/91)  RR: 18 (06-28-17 @ 11:32) (18 - 18)  SpO2: 96% (06-28-17 @ 11:32) (95% - 98%)  Wt(kg): --Vital Signs Last 24 Hrs  T(C): 36.8 (28 Jun 2017 11:32), Max: 37.7 (27 Jun 2017 20:58)  T(F): 98.3 (28 Jun 2017 11:32), Max: 99.8 (27 Jun 2017 20:58)  HR: 67 (28 Jun 2017 11:32) (62 - 75)  BP: 170/91 (28 Jun 2017 11:32) (138/90 - 174/91)  BP(mean): --  RR: 18 (28 Jun 2017 11:32) (18 - 18)  SpO2: 96% (28 Jun 2017 11:32) (95% - 98%)  Wt(kg): --    PHYSICAL EXAM:  GENERAL: NAD, well-groomed, well-developed  HEAD:  Atraumatic, Normocephalic  EYES: EOMI, PERRLA, conjunctiva and sclera clear  ENMT: No tonsillar erythema, exudates, or enlargement; Moist mucous membranes  NECK: Supple, No JVD, Normal thyroid/ large soft non erythematous area on the left sumbandibular area which patient states to have for many years and she will f/up with her PMD   NERVOUS SYSTEM:  Alert & Oriented X3, Good concentration; Motor Strength 5/5 B/L upper and lower extremities  CHEST/LUNG: Clear to auscultation bilaterally; No rales, rhonchi, wheezing, or rubs  HEART: Regular rate and rhythm; No murmurs, rubs, or gallops  ABDOMEN: Soft, Nontender, Nondistended; Bowel sounds present  EXTREMITIES:  2+ Peripheral Pulses, No clubbing, cyanosis, or edema  SKIN: left eye ecchymosis and hematoma of the left frontal scalp     Care Discussed with Consultants/Other Providers [ x] YES Nurse     LABS:                        11.9   6.27  )-----------( 191      ( 28 Jun 2017 08:45 )             35.7     06-28    140  |  102  |  14  ----------------------------<  99  4.0   |  26  |  0.53    Ca    9.1      28 Jun 2017 08:45  Phos  3.5     06-27  Mg     2.0     06-27          HEALTH ISSUES - PROBLEM Dx:  Wrist pain, acute, left: Wrist pain, acute, left  Rhabdomyolysis: Rhabdomyolysis  Alcohol dependence: Alcohol dependence  Hypertensive urgency: Hypertensive urgency

## 2017-06-28 NOTE — PROGRESS NOTE ADULT - ASSESSMENT
66 F with hx Alcohol dependence, HTN admitted with Hypertensive Urgency, with resolving Rhabdomyolysis s/p fall

## 2017-06-28 NOTE — PROGRESS NOTE ADULT - PROBLEM SELECTOR PLAN 1
Not at goal , will continue metoprolol   Will increase Lisinopril to 5 mg daily   repeat BP / Nurse is aware

## 2017-06-29 VITALS — HEART RATE: 72 BPM | SYSTOLIC BLOOD PRESSURE: 168 MMHG | DIASTOLIC BLOOD PRESSURE: 77 MMHG

## 2017-06-29 LAB
ALBUMIN SERPL ELPH-MCNC: 3.3 G/DL — SIGNIFICANT CHANGE UP (ref 3.3–5)
ALP SERPL-CCNC: 57 U/L — SIGNIFICANT CHANGE UP (ref 40–120)
ALT FLD-CCNC: 22 U/L — SIGNIFICANT CHANGE UP (ref 10–45)
ANION GAP SERPL CALC-SCNC: 15 MMOL/L — SIGNIFICANT CHANGE UP (ref 5–17)
AST SERPL-CCNC: 36 U/L — SIGNIFICANT CHANGE UP (ref 10–40)
BASOPHILS # BLD AUTO: 0.02 K/UL — SIGNIFICANT CHANGE UP (ref 0–0.2)
BASOPHILS NFR BLD AUTO: 0.4 % — SIGNIFICANT CHANGE UP (ref 0–2)
BILIRUB SERPL-MCNC: 1.1 MG/DL — SIGNIFICANT CHANGE UP (ref 0.2–1.2)
BUN SERPL-MCNC: 15 MG/DL — SIGNIFICANT CHANGE UP (ref 7–23)
CALCIUM SERPL-MCNC: 9.3 MG/DL — SIGNIFICANT CHANGE UP (ref 8.4–10.5)
CHLORIDE SERPL-SCNC: 104 MMOL/L — SIGNIFICANT CHANGE UP (ref 96–108)
CO2 SERPL-SCNC: 24 MMOL/L — SIGNIFICANT CHANGE UP (ref 22–31)
CREAT SERPL-MCNC: 0.63 MG/DL — SIGNIFICANT CHANGE UP (ref 0.5–1.3)
EOSINOPHIL # BLD AUTO: 0.11 K/UL — SIGNIFICANT CHANGE UP (ref 0–0.5)
EOSINOPHIL NFR BLD AUTO: 2 % — SIGNIFICANT CHANGE UP (ref 0–6)
GLUCOSE SERPL-MCNC: 103 MG/DL — HIGH (ref 70–99)
HCT VFR BLD CALC: 35 % — SIGNIFICANT CHANGE UP (ref 34.5–45)
HGB BLD-MCNC: 11.6 G/DL — SIGNIFICANT CHANGE UP (ref 11.5–15.5)
IMM GRANULOCYTES NFR BLD AUTO: 0.2 % — SIGNIFICANT CHANGE UP (ref 0–1.5)
LYMPHOCYTES # BLD AUTO: 1.5 K/UL — SIGNIFICANT CHANGE UP (ref 1–3.3)
LYMPHOCYTES # BLD AUTO: 26.7 % — SIGNIFICANT CHANGE UP (ref 13–44)
MCHC RBC-ENTMCNC: 30 PG — SIGNIFICANT CHANGE UP (ref 27–34)
MCHC RBC-ENTMCNC: 33.1 GM/DL — SIGNIFICANT CHANGE UP (ref 32–36)
MCV RBC AUTO: 90.4 FL — SIGNIFICANT CHANGE UP (ref 80–100)
MONOCYTES # BLD AUTO: 0.55 K/UL — SIGNIFICANT CHANGE UP (ref 0–0.9)
MONOCYTES NFR BLD AUTO: 9.8 % — SIGNIFICANT CHANGE UP (ref 2–14)
NEUTROPHILS # BLD AUTO: 3.42 K/UL — SIGNIFICANT CHANGE UP (ref 1.8–7.4)
NEUTROPHILS NFR BLD AUTO: 60.9 % — SIGNIFICANT CHANGE UP (ref 43–77)
PLATELET # BLD AUTO: 198 K/UL — SIGNIFICANT CHANGE UP (ref 150–400)
POTASSIUM SERPL-MCNC: 3.9 MMOL/L — SIGNIFICANT CHANGE UP (ref 3.5–5.3)
POTASSIUM SERPL-SCNC: 3.9 MMOL/L — SIGNIFICANT CHANGE UP (ref 3.5–5.3)
PROT SERPL-MCNC: 6.9 G/DL — SIGNIFICANT CHANGE UP (ref 6–8.3)
RBC # BLD: 3.87 M/UL — SIGNIFICANT CHANGE UP (ref 3.8–5.2)
RBC # FLD: 14.4 % — SIGNIFICANT CHANGE UP (ref 10.3–14.5)
SODIUM SERPL-SCNC: 143 MMOL/L — SIGNIFICANT CHANGE UP (ref 135–145)
WBC # BLD: 5.61 K/UL — SIGNIFICANT CHANGE UP (ref 3.8–10.5)
WBC # FLD AUTO: 5.61 K/UL — SIGNIFICANT CHANGE UP (ref 3.8–10.5)

## 2017-06-29 PROCEDURE — 99239 HOSP IP/OBS DSCHRG MGMT >30: CPT

## 2017-06-29 PROCEDURE — 93306 TTE W/DOPPLER COMPLETE: CPT

## 2017-06-29 PROCEDURE — 96374 THER/PROPH/DIAG INJ IV PUSH: CPT

## 2017-06-29 PROCEDURE — 85027 COMPLETE CBC AUTOMATED: CPT

## 2017-06-29 PROCEDURE — 99285 EMERGENCY DEPT VISIT HI MDM: CPT | Mod: 25

## 2017-06-29 PROCEDURE — 96375 TX/PRO/DX INJ NEW DRUG ADDON: CPT

## 2017-06-29 PROCEDURE — 84100 ASSAY OF PHOSPHORUS: CPT

## 2017-06-29 PROCEDURE — 82150 ASSAY OF AMYLASE: CPT

## 2017-06-29 PROCEDURE — 82550 ASSAY OF CK (CPK): CPT

## 2017-06-29 PROCEDURE — 93005 ELECTROCARDIOGRAM TRACING: CPT

## 2017-06-29 PROCEDURE — 90715 TDAP VACCINE 7 YRS/> IM: CPT

## 2017-06-29 PROCEDURE — 84484 ASSAY OF TROPONIN QUANT: CPT

## 2017-06-29 PROCEDURE — 82553 CREATINE MB FRACTION: CPT

## 2017-06-29 PROCEDURE — 84156 ASSAY OF PROTEIN URINE: CPT

## 2017-06-29 PROCEDURE — 80074 ACUTE HEPATITIS PANEL: CPT

## 2017-06-29 PROCEDURE — 85610 PROTHROMBIN TIME: CPT

## 2017-06-29 PROCEDURE — 85730 THROMBOPLASTIN TIME PARTIAL: CPT

## 2017-06-29 PROCEDURE — 71045 X-RAY EXAM CHEST 1 VIEW: CPT

## 2017-06-29 PROCEDURE — 72125 CT NECK SPINE W/O DYE: CPT

## 2017-06-29 PROCEDURE — 80053 COMPREHEN METABOLIC PANEL: CPT

## 2017-06-29 PROCEDURE — 83735 ASSAY OF MAGNESIUM: CPT

## 2017-06-29 PROCEDURE — 81001 URINALYSIS AUTO W/SCOPE: CPT

## 2017-06-29 PROCEDURE — 97161 PT EVAL LOW COMPLEX 20 MIN: CPT

## 2017-06-29 PROCEDURE — 80048 BASIC METABOLIC PNL TOTAL CA: CPT

## 2017-06-29 PROCEDURE — 70450 CT HEAD/BRAIN W/O DYE: CPT

## 2017-06-29 PROCEDURE — 84443 ASSAY THYROID STIM HORMONE: CPT

## 2017-06-29 PROCEDURE — 90471 IMMUNIZATION ADMIN: CPT

## 2017-06-29 PROCEDURE — 83690 ASSAY OF LIPASE: CPT

## 2017-06-29 PROCEDURE — 73090 X-RAY EXAM OF FOREARM: CPT

## 2017-06-29 PROCEDURE — 83874 ASSAY OF MYOGLOBIN: CPT

## 2017-06-29 RX ORDER — LISINOPRIL 2.5 MG/1
1 TABLET ORAL
Qty: 30 | Refills: 0
Start: 2017-06-29 | End: 2017-07-29

## 2017-06-29 RX ORDER — METOPROLOL TARTRATE 50 MG
1 TABLET ORAL
Qty: 60 | Refills: 0 | OUTPATIENT
Start: 2017-06-29 | End: 2017-07-29

## 2017-06-29 RX ORDER — LISINOPRIL 2.5 MG/1
1 TABLET ORAL
Qty: 30 | Refills: 0 | OUTPATIENT
Start: 2017-06-29 | End: 2017-07-29

## 2017-06-29 RX ORDER — LISINOPRIL 2.5 MG/1
5 TABLET ORAL DAILY
Qty: 0 | Refills: 0 | Status: DISCONTINUED | OUTPATIENT
Start: 2017-06-29 | End: 2017-06-29

## 2017-06-29 RX ORDER — FOLIC ACID 0.8 MG
1 TABLET ORAL
Qty: 30 | Refills: 0
Start: 2017-06-29 | End: 2017-07-29

## 2017-06-29 RX ORDER — FOLIC ACID 0.8 MG
1 TABLET ORAL
Qty: 30 | Refills: 0 | OUTPATIENT
Start: 2017-06-29 | End: 2017-07-29

## 2017-06-29 RX ORDER — LISINOPRIL 2.5 MG/1
5 TABLET ORAL ONCE
Qty: 0 | Refills: 0 | Status: COMPLETED | OUTPATIENT
Start: 2017-06-29 | End: 2017-06-29

## 2017-06-29 RX ORDER — METOPROLOL TARTRATE 50 MG
1 TABLET ORAL
Qty: 60 | Refills: 0
Start: 2017-06-29 | End: 2017-07-29

## 2017-06-29 RX ADMIN — Medication 25 MILLIGRAM(S): at 05:42

## 2017-06-29 RX ADMIN — Medication 1 MILLIGRAM(S): at 11:57

## 2017-06-29 RX ADMIN — LISINOPRIL 5 MILLIGRAM(S): 2.5 TABLET ORAL at 14:45

## 2017-06-29 RX ADMIN — Medication 1 TABLET(S): at 11:57

## 2017-06-29 RX ADMIN — LISINOPRIL 5 MILLIGRAM(S): 2.5 TABLET ORAL at 05:42

## 2017-06-29 NOTE — DISCHARGE NOTE ADULT - MEDICATION SUMMARY - MEDICATIONS TO TAKE
I will START or STAY ON the medications listed below when I get home from the hospital:    lisinopril 5 mg oral tablet  -- 1 tab(s) by mouth once a day  -- Indication: For BP     metoprolol tartrate 25 mg oral tablet  -- 1 tab(s) by mouth 2 times a day  -- Indication: For BP     Multiple Vitamins oral tablet  -- 1 tab(s) by mouth once a day  -- Indication: For Supplement    folic acid 1 mg oral tablet  -- 1 tab(s) by mouth once a day  -- Indication: For Supplement I will START or STAY ON the medications listed below when I get home from the hospital:    lisinopril 10 mg oral tablet  -- 1 tab(s) by mouth once a day  -- Indication: For Heart     metoprolol tartrate 25 mg oral tablet  -- 1 tab(s) by mouth 2 times a day  -- Indication: For Heart     Multiple Vitamins oral tablet  -- 1 tab(s) by mouth once a day  -- Indication: For supplement     folic acid 1 mg oral tablet  -- 1 tab(s) by mouth once a day  -- Indication: For supplement

## 2017-06-29 NOTE — DISCHARGE NOTE ADULT - CARE PLAN
Principal Discharge DX:	Alcohol dependence  Goal:	s/p course of labs  / stable  Instructions for follow-up, activity and diet:	Take your medications as directed   Follow up as an out-pt within 4-7 days   Call for appointment to follow up day after discharge   Make an appointment with an MD re: BP and medications  Secondary Diagnosis:	Essential hypertension  Goal:	stable  Instructions for follow-up, activity and diet:	Take your medications as directed   Follow up as an out-pt within 4-7 days  Secondary Diagnosis:	Rhabdomyolysis  Goal:	resolving  / s/p course of IV fluids  Instructions for follow-up, activity and diet:	Take your medications as directed   Follow up as an out-pt within 4-7 days Principal Discharge DX:	Alcohol dependence  Goal:	s/p course of labs  / stable  Instructions for follow-up, activity and diet:	Refrain from any alcohol use   Follow up with rehab  / AA  - no alcohol    Take your medications as directed   Follow up as an out-pt within 4-7 days   Call for appointment to follow up day after discharge   Make an appointment with an MD re: BP and medications  Secondary Diagnosis:	Essential hypertension  Goal:	stable  Instructions for follow-up, activity and diet:	Take your medications as directed   Follow up as an out-pt within 4-7 days  Secondary Diagnosis:	Rhabdomyolysis  Goal:	resolving  / s/p course of IV fluids  Instructions for follow-up, activity and diet:	Take your medications as directed   Follow up as an out-pt within 4-7 days

## 2017-06-29 NOTE — PROGRESS NOTE ADULT - SUBJECTIVE AND OBJECTIVE BOX
FAITH KILGOREO  66y  Female      Patient is a 66y old  Female who presents with a chief complaint of s/p fall     INTERVAL HPI/OVERNIGHT EVENTS:    No overnight event.  No headache.  Patient feels better.     REVIEW OF SYSTEMS:  CONSTITUTIONAL: No fever  EYES: No eye pain, visual disturbances, or discharge  ENMT:  Nor throat pain  NECK: No pain or stiffness  RESPIRATORY: No cough, wheezing, chills or hemoptysis; No shortness of breath  CARDIOVASCULAR: No chest pain, palpitations, dizziness, or leg swelling  GASTROINTESTINAL: No abdominal or epigastric pain. No nausea, vomiting, or hematemesis; No diarrhea or constipation  NEUROLOGICAL: No headaches,  no tremors  MUSCULOSKELETAL: No joint pain currently   PSYCHIATRIC: No delusions or hallucinations      PHYSICAL EXAM:  GENERAL: NAD, well-groomed, well-developed  HEAD:  Atraumatic, Normocephalic  EYES: EOMI, PERRLA, conjunctiva and sclera clear  ENMT: No tonsillar erythema, exudates, or enlargement; Moist mucous membranes  NECK: Supple,   NERVOUS SYSTEM:  Alert & Oriented X3, Good concentration; Motor Strength 5/5 B/L upper and lower extremities  CHEST/LUNG: Clear to auscultation bilaterally; No rales, rhonchi, wheezing, or rubs  HEART: Regular rate and rhythm; No murmurs, rubs, or gallops  ABDOMEN: Soft, Nontender, Nondistended; Bowel sounds present  EXTREMITIES:  2+ Peripheral Pulses, No clubbing, cyanosis, or edema  SKIN: left eye ecchymosis and hematoma of the left frontal scalp                           11.6   5.61  )-----------( 198      ( 29 Jun 2017 07:14 )             35.0       CARDIAC MARKERS ( 28 Jun 2017 08:45 )  x     / x     / 909 U/L / x     / x          LIVER FUNCTIONS - ( 29 Jun 2017 08:27 )  Alb: 3.3 g/dL / Pro: 6.9 g/dL / ALK PHOS: 57 U/L / ALT: 22 U/L / AST: 36 U/L / GGT: x             143|104|15<103  3.9|24|0.63  9.3,--,--  06-29 @ 08:27 FAITH KILGOREO  66y  Female      Patient is a 66y old  Female who presents with a chief complaint of s/p fall     INTERVAL HPI/OVERNIGHT EVENTS:    No overnight event.  No headache.  Patient feels better, but feels light headed while bending down. .     REVIEW OF SYSTEMS:  CONSTITUTIONAL: No fever  EYES: No eye pain, visual disturbances, or discharge  ENMT:  Nor throat pain  NECK: No pain or stiffness  RESPIRATORY: No cough, wheezing, chills or hemoptysis; No shortness of breath  CARDIOVASCULAR: No chest pain, palpitations, dizziness, or leg swelling  GASTROINTESTINAL: No abdominal or epigastric pain. No nausea, vomiting, or hematemesis; No diarrhea or constipation  NEUROLOGICAL: No headaches,  no tremors  MUSCULOSKELETAL: No joint pain currently   PSYCHIATRIC: No delusions or hallucinations      PHYSICAL EXAM:  GENERAL: NAD, well-groomed, well-developed  HEAD:  Atraumatic, Normocephalic  EYES: EOMI, PERRLA, conjunctiva and sclera clear  ENMT: No tonsillar erythema, exudates, or enlargement; Moist mucous membranes  NECK: Supple,   NERVOUS SYSTEM:  Alert & Oriented X3, Good concentration; Motor Strength 5/5 B/L upper and lower extremities  CHEST/LUNG: Clear to auscultation bilaterally; No rales, rhonchi, wheezing, or rubs  HEART: Regular rate and rhythm; No murmurs, rubs, or gallops  ABDOMEN: Soft, Nontender, Nondistended; Bowel sounds present  EXTREMITIES:  2+ Peripheral Pulses, No clubbing, cyanosis, or edema  SKIN: left eye ecchymosis and hematoma of the left frontal scalp                           11.6   5.61  )-----------( 198      ( 29 Jun 2017 07:14 )             35.0       CARDIAC MARKERS ( 28 Jun 2017 08:45 )  x     / x     / 909 U/L / x     / x          LIVER FUNCTIONS - ( 29 Jun 2017 08:27 )  Alb: 3.3 g/dL / Pro: 6.9 g/dL / ALK PHOS: 57 U/L / ALT: 22 U/L / AST: 36 U/L / GGT: x             143|104|15<103  3.9|24|0.63  9.3,--,--  06-29 @ 08:27

## 2017-06-29 NOTE — PROGRESS NOTE ADULT - PROBLEM SELECTOR PLAN 2
CIWA protocol as above with symptom based treatment for now, but may need to reevaluate if need for low dose Librium.  Social work.  Thiamine, folate, MVI.
X ray Lt Wrist r/o fracture.   Pain medications prn.
cont IVF   CPK in AM
cont IVF   CPK in AM

## 2017-06-29 NOTE — DISCHARGE NOTE ADULT - HOSPITAL COURSE
to be completed by attending 67 y/o F--patient irregularly followed by a Dr. Dial (?) in [facility]--patient is a retired speech pathologist--self refers to [place] following a fall down [facility] down her stairs at home while imbibing alcohol.  Patient admits that she was abstinent until 3 months ago (patient did not clarify prior intake) but reports daily wine intake with beer chasers.  Last intake was prior to arrival in the ER yesterday afternoon.  Patient denies prior hospitalizations.  Denies street drug use.  Patient reports no suicidal or homicidal ideation and reports no domestic violence issues.  Mild HA earlier in the ER, but denies HA, focal weakness.  No chest pain/pressure.  NO dyspnea.  NO N/V.  No abdominal pain, no red blood per rectum or melena.    Admitted patient for Hypertensive Urgency, s/p fall with Acute Rhabdomyolysis. .   HTN urgency- monitored on tele with no events, trended  down Cardiac Enzymes. Patient was started on Metoprolol and increased Lisinopril from 2.5 mg  to 10 mg based on blood pressure readings during hospital course. Echo showed normal LV systolic Function with mild diastolic dysfunction.   Acute Rhabdomyolysis s/p fall- All imaging negative, started patient on Ringer Lactate, trended down CPK levels. CIWA score remained Zero.     Discharge  Condition stable.    Discharge Diagnosis  Hypertensive urgency  Acute Rhabdomyolysis s/p fall.

## 2017-06-29 NOTE — DISCHARGE NOTE ADULT - ADDITIONAL INSTRUCTIONS
Take your medications as directed   Follow up as an out-pt within 4-7 days   Call for appointment to follow up day after discharge   Make an appointment with an MD re: BP and medications Refrain from any alcohol use   Take your medications as directed   Follow up as an out-pt within 4-7 days   Call for appointment to follow up day after discharge   Make an appointment with an MD re: BP and medications Refrain from any alcohol use   Follow up with AA  - Rehab / no alcohol use    Take your medications as directed   Follow up as an out-pt within 4-7 days   Call for appointment to follow up day after discharge   Make an appointment with an MD re: BP and medications

## 2017-06-29 NOTE — PROGRESS NOTE ADULT - PROBLEM SELECTOR PLAN 1
Stable on Metoprolol and Lisinopril. Still elevated on Metoprolol and Lisinopril.   Consider increasing Lisinopril 10 mg, follow up with pmd.  Check Orthostasis as patient complaining of dizziness while bending.

## 2017-06-29 NOTE — DISCHARGE NOTE ADULT - PLAN OF CARE
s/p course of labs  / stable Take your medications as directed   Follow up as an out-pt within 4-7 days   Call for appointment to follow up day after discharge   Make an appointment with an MD re: BP and medications stable Take your medications as directed   Follow up as an out-pt within 4-7 days resolving  / s/p course of IV fluids Refrain from any alcohol use   Follow up with rehab  / AA  - no alcohol    Take your medications as directed   Follow up as an out-pt within 4-7 days   Call for appointment to follow up day after discharge   Make an appointment with an MD re: BP and medications

## 2017-06-29 NOTE — PHYSICAL THERAPY INITIAL EVALUATION ADULT - PERTINENT HX OF CURRENT PROBLEM, REHAB EVAL
66 F with hx Alcohol dependence, HTN admitted with Hypertensive Urgency, with resolving Rhabdomyolysis s/p fall. X-ray L forearm (6/27): (-) fx. CT head (6/25): L parieto-occipital & frontal scalp hematomas. Pt s/p TTE 6/28.

## 2017-06-29 NOTE — PHYSICAL THERAPY INITIAL EVALUATION ADULT - DISCHARGE DISPOSITION, PT EVAL
home/Home with no skilled PT needs. No AD recommendation. **Pt cleared for d/c home from PT perspective at this time./no skilled PT needs

## 2017-06-29 NOTE — DISCHARGE NOTE ADULT - PATIENT PORTAL LINK FT
“You can access the FollowHealth Patient Portal, offered by [facility], by registering with the following website: [email or web address]”

## 2023-08-15 PROBLEM — I10 ESSENTIAL (PRIMARY) HYPERTENSION: Chronic | Status: ACTIVE | Noted: 2017-06-26

## 2023-08-15 PROBLEM — F10.20 ALCOHOL DEPENDENCE, UNCOMPLICATED: Chronic | Status: ACTIVE | Noted: 2017-06-26

## 2023-09-15 VITALS
SYSTOLIC BLOOD PRESSURE: 162 MMHG | WEIGHT: 185.41 LBS | HEART RATE: 79 BPM | TEMPERATURE: 97 F | HEIGHT: 64 IN | RESPIRATION RATE: 16 BRPM | DIASTOLIC BLOOD PRESSURE: 84 MMHG | OXYGEN SATURATION: 95 %

## 2023-09-15 NOTE — ASU PATIENT PROFILE, ADULT - NSICDXPASTMEDICALHX_GEN_ALL_CORE_FT
PAST MEDICAL HISTORY:  Abnormal findings on diagnostic imaging of body structures     Alcohol dependence     Essential hypertension

## 2023-09-16 ENCOUNTER — OUTPATIENT (OUTPATIENT)
Dept: OUTPATIENT SERVICES | Facility: HOSPITAL | Age: 73
LOS: 1 days | Discharge: HOME | End: 2023-09-16
Payer: MEDICARE

## 2023-09-16 VITALS
SYSTOLIC BLOOD PRESSURE: 145 MMHG | DIASTOLIC BLOOD PRESSURE: 67 MMHG | HEART RATE: 62 BPM | OXYGEN SATURATION: 99 % | RESPIRATION RATE: 20 BRPM

## 2023-09-16 LAB
BLD GP AB SCN SERPL QL: NEGATIVE — SIGNIFICANT CHANGE UP
RH IG SCN BLD-IMP: POSITIVE — SIGNIFICANT CHANGE UP

## 2023-09-16 PROCEDURE — 86900 BLOOD TYPING SEROLOGIC ABO: CPT

## 2023-09-16 PROCEDURE — 86901 BLOOD TYPING SEROLOGIC RH(D): CPT

## 2023-09-16 PROCEDURE — 86850 RBC ANTIBODY SCREEN: CPT

## 2023-09-16 PROCEDURE — 57410 PELVIC EXAMINATION: CPT

## 2023-09-16 DEVICE — MYOSURE TISSUE REMOVAL DEVICE XL: Type: IMPLANTABLE DEVICE | Status: FUNCTIONAL

## 2023-09-16 DEVICE — MYOSURE TISSUE REMOVAL DEVICE REACH: Type: IMPLANTABLE DEVICE | Status: FUNCTIONAL

## 2023-09-16 RX ORDER — SODIUM CHLORIDE 9 MG/ML
1000 INJECTION INTRAMUSCULAR; INTRAVENOUS; SUBCUTANEOUS
Refills: 0 | Status: DISCONTINUED | OUTPATIENT
Start: 2023-09-16 | End: 2023-09-16

## 2023-09-16 RX ORDER — SIMETHICONE 80 MG/1
80 TABLET, CHEWABLE ORAL EVERY 6 HOURS
Refills: 0 | Status: DISCONTINUED | OUTPATIENT
Start: 2023-09-16 | End: 2023-09-16

## 2023-09-16 RX ORDER — SODIUM CHLORIDE 9 MG/ML
1000 INJECTION, SOLUTION INTRAVENOUS
Refills: 0 | Status: DISCONTINUED | OUTPATIENT
Start: 2023-09-16 | End: 2023-09-16

## 2023-09-16 RX ORDER — ACETAMINOPHEN 500 MG
1000 TABLET ORAL EVERY 6 HOURS
Refills: 0 | Status: DISCONTINUED | OUTPATIENT
Start: 2023-09-16 | End: 2023-09-16

## 2023-09-16 RX ORDER — AMLODIPINE BESYLATE 2.5 MG/1
1 TABLET ORAL
Refills: 0 | DISCHARGE

## 2023-09-16 RX ORDER — HYDROCHLOROTHIAZIDE 25 MG
1 TABLET ORAL
Refills: 0 | DISCHARGE

## 2023-09-16 RX ORDER — ONDANSETRON 8 MG/1
8 TABLET, FILM COATED ORAL EVERY 8 HOURS
Refills: 0 | Status: DISCONTINUED | OUTPATIENT
Start: 2023-09-16 | End: 2023-09-16

## 2023-09-16 RX ORDER — HYDROMORPHONE HYDROCHLORIDE 2 MG/ML
0.5 INJECTION INTRAMUSCULAR; INTRAVENOUS; SUBCUTANEOUS
Refills: 0 | Status: DISCONTINUED | OUTPATIENT
Start: 2023-09-16 | End: 2023-09-16

## 2023-09-16 NOTE — BRIEF OPERATIVE NOTE - OPERATION/FINDINGS
endometrial polyp extensive and careful attend of a severe stenotic cervix  attended with lacrimal dilator unsuuscefully no

## 2023-09-16 NOTE — DISCHARGE NOTE NURSING/CASE MANAGEMENT/SOCIAL WORK - PATIENT PORTAL LINK FT
You can access the FollowMyHealth Patient Portal offered by [facility] by registering at the following website: [email or web address]. By joining [employer]’s FollowMyHealth portal, you will also be able to view your health information using other applications (apps) compatible with our system.

## 2023-09-16 NOTE — DISCHARGE NOTE NURSING/CASE MANAGEMENT/SOCIAL WORK - NSDCVIVACCINE_GEN_ALL_CORE_FT
Tdap; 25-Jun-2017 19:52; Sonido Zepeda); [employer]; [id number]; IntraMuscular; Deltoid Left.; 0.5 milliLiter(s); VIS (VIS Published: 09-May-2013, VIS Presented: 25-Jun-2017);

## 2023-09-16 NOTE — PRE-ANESTHESIA EVALUATION ADULT - NSANTHOSAYNRD_GEN_A_CORE
No. JOAQUÍN screening performed.  STOP BANG Legend: 0-2 = LOW Risk; 3-4 = INTERMEDIATE Risk; 5-8 = HIGH Risk

## 2023-09-16 NOTE — DISCHARGE NOTE NURSING/CASE MANAGEMENT/SOCIAL WORK - NSDCPEFALRISK_GEN_ALL_CORE
For information on Fall & Injury Prevention, visit: [email or web address] OR  [email or web address] OR  https://www.cdc.gov/steadi/patient.html

## 2023-09-16 NOTE — BRIEF OPERATIVE NOTE - NSICDXBRIEFPROCEDURE_GEN_ALL_CORE_FT
Follow up:   Reports fragmented sleep and emotional distress   Recently experienced a cluster of auras     Prior note:   Trigger for migraine - stress   Relieving factor  - exercise      Prior note:   Now has severe migraine 2/month   ubrelvy - partial relief     Prior note:   Overall doing much better   Feels BOTOX wore off 1 week ago     Prior note:   Overall unchanged   Still has nearly daily HAs  Ubrelvy  is effective      Prior note:   S/p PT, dry needling (SCM, occipitalis, trap) - very effective (but lasts only few days)   Clenched teeth - saw dentist, no solutions   HA are now 3/week. Also has days of CDH. Imitrex helps       Prior note:   Base of skull pain at early AM hours - radiates to front + dizziness      PT with dry needling helps when she has clusters of headaches   Stress is a trigger   Still doing yoga, meditation   Using mouth guard      Also reports throat 'spasms'. No benefit from cingulair      Taking Imitrex nearly daily      3/4/20 e-mail: Hello! First, I want to report that I'm still making amazing progress in reducing and almost eliminating my migraines! Physical therapy and stress reduction have been keys. I was recently able to add everyday exercise back into my routine, with few headaches afterward. This is miraculous! I have gone through all of my prescribed PT sessions, and I don't feel I need to continue on a regular basis. However, in order to continue to be physically active, both with at-home PT exercises and regular fitness (running, walking, yoga), it would be helpful to have PT every 2-3 weeks for awhile, as my muscles continue to adjust to better posture and more effective functioning. Is it possible to prescribe additional PT at less frequency? Thank you so much!     Prior note:   Stress -> Neck pain -> HA + nausea   overactivation of trap      Prior note:   Oct 2014 - diag with Hemicran cont (L) - pain points on the L side + photo/phono  Indomethacin 25mg PRN - helped    Currently 4/week Hz      Headache history:   Age of onset -  Childhood   Location - R or L   Nature of pain -  Throbbing   Prodrome - no   Aura -  No   Duration of headache - > 4 hrs   Time to peak intensity - hrs   Pain scale - range of intensity -  8/10  Frequency -  2-3/month   Status Migrainosus history - yes   Time of day of most headaches- anytime      Associated symptoms with the headache:   Meningeal symptoms - photophobia, phonophobia, exercise intolerance +   Nausea/vomitting+   Nasal drainage   Visual blurriness   Pallor/flushing  Dizziness +   Vertigo  Confusion  Impaired concentration +   Pain worsened with physical activity +   Neck pain   Cheek pain on R +      Cluster headache symptoms: none      Symptoms of increased intracranial pressure: none      Basilar migraine symptoms: none      Headache Triggers:   Stress +   Bright or flickering light  Strong smell  Vigorous exercise  Dietary factors   Visual strain   Weather changes +   Exertion  Heat (hot weather, hot baths or showers)  Menses     Other comorbid conditions:  Anxiety - yes   Motion sickness symptom - no      Treatment history:  Resolution of headache with sleep - yes   Meds tried:  H/o Renal stones   Fioricet, relpax, xanax   Flexeril   Medrol dose   maxalt - sluggish   trudhesa -feeling weird   Antiviral - not help   accupressure - helped   accupunture - helped   PT - helps   estradiol 0.05 mg/24 hr td ptsw (VIVELLE-DOT) 0.05 mg/24 hr  - twice weekly patch - helps with hot flashes, now on daily pill   Topamax 25 mg - not help   Gabapentin 100 mg  - drowsy, not help    mobic - helped   BOTOX#1 1/31/22 - dramatic improvement in HA   BOTOX#2 8/4/22 - dramatic improvement in HA    BOTOX#3 8/4/22 - dramatic improvement in HA    BOTOX#4 11/3/22 - dramatic improvement in HA    BOTOX#5 2/2/23 - dramatic improvement in HA     Headache risk factors:   H/o TBI  - no   H/o Meningitis  - no   F/h Aneurysms  - no     Headache burden:   In the last  three months:  Days missed from work = several   Days unable to perform activities of daily living = 0  Days unable to attend social activities = several       Review of Systems   Constitutional: Negative.    HENT: Negative.    Eyes: Negative.    Respiratory: Negative.    Cardiovascular: Negative.    Gastrointestinal: Negative.    Endocrine: Negative.    Genitourinary: Negative.    Musculoskeletal: neck pain.    Skin: Negative.    Allergic/Immunologic: Negative.    Neurological: Negative.    Hematological: Negative.    Psychiatric/Behavioral: anxiety.        Objective:   Physical Exam   Constitutional: She is oriented to person, place, and time. She appears well-developed and well-nourished.   Psychiatric: She has a normal mood and affect. Her behavior is normal. Judgment and thought content normal.       Assessment:       1. Intractable chronic migraine without aura   2. Myofacial pain (pressure points)- masseter, neck   3. Tonsillar hernia into foramen magnum      Her HA are unlikely from a TAC and more likely from her tonsillar herniation and posture      Neck and upper chest - vibrating sensation      MRI C spine  Impression       No focal disk abnormalities or significant central canal or neural foraminal stenosis.  ______________________________________     Electronically signed by resident: ELIUD LEVY MD  Date: 11/07/14  Time: 10:51      MRI brain  Impression        Approximate 3 mm of cerebellar tonsillar ectopia.    2-3 punctate foci of T2/flair signal hyperintensity frontal subcortical white matter which are nonspecific and of uncertain clinical significance.    Otherwise unremarkable MRI brain without evidence for acute infarction or enhancing lesion.    Electronically signed by: TRISH GOFF DO  Date: 10/24/14  Time: 14:27       Degenerative findings:   C2-C3: No spinal canal stenosis or neural foraminal narrowing.   C3-C4: Posterior disc osteophyte complex and left facet joint arthropathy resulting in  mild left-sided neural foraminal narrowing.  No spinal canal stenosis.   C4-C5: Posterior disc osteophyte complex with no significant spinal canal stenosis or neural foraminal narrowing.   C5-C6: Posterior disc osteophyte complex with mild spinal canal stenosis.  No neural foraminal narrowing.   C6-C7: Posterior disc osteophyte complex with no significant spinal canal stenosis or neural foraminal narrowing.   C7-T1: No spinal canal stenosis or neural foraminal narrowing.  Redemonstration perineural cysts within the proximal extraforaminal space bilaterally at this level which possibly compressing on the exiting nerve roots.   Paraspinal muscles & soft tissues: Unremarkable.     Impression:   1. Redemonstration of perineural cysts bilaterally at the level of C7-T1, as above.  2. No significant spinal canal stenosis or neural foraminal narrowing throughout the cervical spine, as above.     Electronically signed by resident: Macho Boone  Date:                                            08/20/2020  Time:                                           09:29     Electronically signed by: Mauricio Pitt MD  Date:                                            08/20/2020  Time:                                           09:55      Results for MILAGRO LAISHA BAE (MRN [id number]) as of 10/11/2019 09:44    Ref. Range 2/5/2019 10:19   Vitamin B-12 Latest Ref Range: 210 - 950 pg/mL >2000 (H)   Thiamine Latest Ref Range: 38 - 122 ug/L 45   Vit D, 25-Hydroxy Latest Ref Range: 30 - 96 ng/mL 75   Vitamin B2 Latest Ref Range: 1 - 19 mcg/L 18   Vitamin B6 Latest Ref Range: 5 - 50 ug/L 131 (H)   Antigliadin Ab IgG Latest Ref Range: <20 UNITS 2   Antigliadin Abs, IgA Latest Ref Range: <20 UNITS 7   TTG IgA Latest Ref Range: <20 UNITS 9   TTG IgG Latest Ref Range: <20 UNITS 3   Immunoglobulin A (IgA) Latest Ref Range: 70 - 400 mg/dL 172      Plan:   cont estrogen, Singulair, amlodipine   Breakthrough headache - imitrex 100 mg, xanax, mobic,  ubrelvy, pain cream   Dentist - Floyd Erickson ([facility])     BOTOX treatment response:   Prior to initiating BOTOX, the patient had  24  migraine days per month on average. This meets criteria for chronic migraine.   After starting BOTOX, the patient experienced a reduction in  21  days per month   After starting BOTOX, the patient experienced a reduction in > 100 hours of migraine symptoms per month   After starting BOTOX, the patient experienced a 50% reduction in burden of migraine days per month   Based on this information, BOTOX is medically necessary for the management of the patient's chronic migraine.     BOTOX Injection intervals:   Patient reports a 'wearing off effect' prior to the subsequent BOTOX injections at 12 weeks. This occurs at week 10  Symptoms of this a 'wearing off effect' include - worsening of migraine headache frequency and intensity   Medications used during the 'wearing off effect' period include ubrelvy   Based on this information, it is medically necessary for the patient to receive BOTOX therapy at an interval of every 10 weeks for the management of chronic migraine.     BOTOX was performed as an indicated therapy for intractable chronic migraine headaches given that the patient failed > 2 prophylactic medications     Botulinum Toxin Injection Procedure   Pre-operative diagnosis: Chronic migraine   Post-operative diagnosis: Same   Procedure: Chemical neurolysis   After risks and benefits were explained including bleeding, infection, worsening of pain, damage to the areas being injected, weakness of muscles, loss of muscle control, dysphagia if injecting the head or neck, facial droop if injecting the facial area, painful injection, allergic or other reaction to the medications being injected, and the failure of the procedure to help the problem, a signed consent was obtained.   The patient was placed in a comfortable area and the sites to be treated were identified.The area to be  treated was prepped three times with alcohol and the alcohol allowed to dry. Next, a 30 gauge needle was used to inject the medication in the area to be treated.   Area(s) injected:   Total Botox used: 175 Units   Botox wastage: 25 Units   Injection sites:    muscle bilaterally ( a total of 10 units divided into 2 sites)   Procerus muscle (5 units)   Frontalis muscle bilaterally (a total of 20 units divided into 4 sites)   Temporalis muscle bilaterally (a total of 40 units divided into 8 sites)   Occipitalis muscle bilaterally (a total of 30 units divided into 6 sites)   Cervical paraspinal muscles (a total of 20 units divided into 4 sites)   Trapezius muscle bilaterally (a total of 30 units divided into 6 sites)  Masseter 5 units bel    Mastoid 5 units   Complications: none   RTC for the next Botox injection: 10 weeks          Patient verbalized understanding to plan. I answered all her questions to her satisfaction    Procedures   PROCEDURES:  Hysteroscopy with polypectomy of uterus 16-Sep-2023 09:57:47  Shwetha Bui

## 2023-09-16 NOTE — ASU DISCHARGE PLAN (ADULT/PEDIATRIC) - CARE PROVIDER_API CALL
Shwetha Bui  Obstetrics and Gynecology  [address], [address]  [place] [address]  Phone: [phone]  Fax: [phone]  Follow Up Time:

## 2023-09-16 NOTE — PRE-ANESTHESIA EVALUATION ADULT - NSANTHRISKNONERD_GEN_ALL_CORE
What Type Of Note Output Would You Prefer (Optional)?: Standard Output How Did Your Itching Occur?: sudden in onset (over a period of weeks to a few months) How Severe Is Your Itching?: mild No risk alerts present

## (undated) DEVICE — PRESSURE INFUSOR BAG 3000ML

## (undated) DEVICE — ELCTR PLASMA LOOP MEDIUM ANGLED 24FR 12-30 DEG

## (undated) DEVICE — GOWN TRIMAX XXL

## (undated) DEVICE — DRAPE TOWEL BLUE 17" X 24"

## (undated) DEVICE — GLV 8 PROTEXIS (WHITE)

## (undated) DEVICE — DRAPE IRRIGATION POUCH 19X23"

## (undated) DEVICE — TUBING TUR 2 PRONG

## (undated) DEVICE — TUBING AQUILEX INFLOW

## (undated) DEVICE — MYOSURE CANISTER AQUILEX KIT

## (undated) DEVICE — VENODYNE/SCD SLEEVE CALF MEDIUM

## (undated) DEVICE — WARMING BLANKET UPPER ADULT

## (undated) DEVICE — DRSG TELFA 3 X 8

## (undated) DEVICE — PACK D&C

## (undated) DEVICE — SOL IRR BAG NS 0.9% 3000ML

## (undated) DEVICE — MYOSURE SCOPE SEAL

## (undated) DEVICE — TUBING AQUILEX OUTFLOW